# Patient Record
Sex: FEMALE | URBAN - METROPOLITAN AREA
[De-identification: names, ages, dates, MRNs, and addresses within clinical notes are randomized per-mention and may not be internally consistent; named-entity substitution may affect disease eponyms.]

---

## 2024-04-11 ENCOUNTER — TELEPHONE (OUTPATIENT)
Age: 84
End: 2024-04-11

## 2024-04-11 NOTE — TELEPHONE ENCOUNTER
[facility] Palliative Medicine Office  Nursing Note  [phone]  Fax [phone]      Name:  Lindsay Ramirez  YOB: 1940    Received outpatient Palliative Medicine referral from Dr. Eulogio Mina (Partner MD) to see patient for symptom management and supportive care. Chart  reviewed. Lindsay Ramirez is a 83 y.o. female with primary cutaneous T-cell lymphoma.  Referral states that patient has history of multiple rounds of immunotherapy interrupted by drug-related complications, recent hospitalization with severe drug reaction to mogamalizumab.    ACP:     No ACP documents on file    Nurse called patient and her daughter answered the phone. She states that she schedules patient's appointments.   This nurse explained [facility] outpatient Palliative Medicine services. Appointment scheduled for 4/18/24 at 1pm with Dr. Brittney Toussaint.      Ginny Lambert, RN, Gerontological Nursing-BC, [facility]

## 2024-04-16 ENCOUNTER — TELEPHONE (OUTPATIENT)
Age: 84
End: 2024-04-16

## 2024-04-16 NOTE — TELEPHONE ENCOUNTER
Called patient to advise/confirm upcoming appt with Dr. Toussaint on 04/18/2024 at 1:00  at [facility].  Spoke with Neelam and confirmed appointment.

## 2024-04-18 ENCOUNTER — OFFICE VISIT (OUTPATIENT)
Age: 84
End: 2024-04-18
Payer: MEDICARE

## 2024-04-18 VITALS
OXYGEN SATURATION: 99 % | BODY MASS INDEX: 23.57 KG/M2 | DIASTOLIC BLOOD PRESSURE: 68 MMHG | HEIGHT: 62 IN | HEART RATE: 91 BPM | SYSTOLIC BLOOD PRESSURE: 152 MMHG | WEIGHT: 128.1 LBS

## 2024-04-18 DIAGNOSIS — C84.A8 CUTANEOUS T-CELL LYMPHOMA INVOLVING LYMPH NODES OF MULTIPLE REGIONS (HCC): ICD-10-CM

## 2024-04-18 DIAGNOSIS — R53.0 NEOPLASTIC (MALIGNANT) RELATED FATIGUE: Primary | ICD-10-CM

## 2024-04-18 DIAGNOSIS — Z71.89 ACP (ADVANCE CARE PLANNING): ICD-10-CM

## 2024-04-18 DIAGNOSIS — L65.8 ALOPECIA DUE TO CYTOTOXIC DRUG: ICD-10-CM

## 2024-04-18 DIAGNOSIS — T45.1X5A ALOPECIA DUE TO CYTOTOXIC DRUG: ICD-10-CM

## 2024-04-18 PROCEDURE — G8400 PT W/DXA NO RESULTS DOC: HCPCS | Performed by: INTERNAL MEDICINE

## 2024-04-18 PROCEDURE — G8427 DOCREV CUR MEDS BY ELIG CLIN: HCPCS | Performed by: INTERNAL MEDICINE

## 2024-04-18 PROCEDURE — 99205 OFFICE O/P NEW HI 60 MIN: CPT | Performed by: INTERNAL MEDICINE

## 2024-04-18 PROCEDURE — 4004F PT TOBACCO SCREEN RCVD TLK: CPT | Performed by: INTERNAL MEDICINE

## 2024-04-18 PROCEDURE — 1090F PRES/ABSN URINE INCON ASSESS: CPT | Performed by: INTERNAL MEDICINE

## 2024-04-18 PROCEDURE — G8420 CALC BMI NORM PARAMETERS: HCPCS | Performed by: INTERNAL MEDICINE

## 2024-04-18 PROCEDURE — 1123F ACP DISCUSS/DSCN MKR DOCD: CPT | Performed by: INTERNAL MEDICINE

## 2024-04-18 RX ORDER — HYDROCORTISONE 10 MG/1
10 TABLET ORAL DAILY
COMMUNITY

## 2024-04-18 RX ORDER — PROCHLORPERAZINE MALEATE 10 MG
10 TABLET ORAL 3 TIMES DAILY PRN
COMMUNITY

## 2024-04-18 RX ORDER — LISINOPRIL 10 MG/1
10 TABLET ORAL DAILY
COMMUNITY

## 2024-04-18 RX ORDER — TRIAMCINOLONE ACETONIDE 1 MG/G
1 CREAM TOPICAL 2 TIMES DAILY
COMMUNITY

## 2024-04-18 RX ORDER — AMLODIPINE BESYLATE 5 MG/1
5 TABLET ORAL DAILY
COMMUNITY

## 2024-04-18 RX ORDER — DIPHENHYDRAMINE HCL 25 MG
25 CAPSULE ORAL NIGHTLY PRN
COMMUNITY

## 2024-04-18 RX ORDER — ACETAMINOPHEN 325 MG/1
650 TABLET ORAL EVERY 6 HOURS PRN
COMMUNITY

## 2024-04-18 RX ORDER — ONDANSETRON 4 MG/1
4 TABLET, FILM COATED ORAL EVERY 8 HOURS PRN
COMMUNITY

## 2024-04-18 RX ORDER — TRAZODONE HYDROCHLORIDE 50 MG/1
50 TABLET ORAL NIGHTLY
COMMUNITY

## 2024-04-18 RX ORDER — CHOLECALCIFEROL (VITAMIN D3) 125 MCG
5 CAPSULE ORAL NIGHTLY
COMMUNITY

## 2024-04-18 RX ORDER — VALACYCLOVIR HYDROCHLORIDE 1 G/1
1000 TABLET, FILM COATED ORAL DAILY
COMMUNITY

## 2024-04-18 NOTE — PROGRESS NOTES
Palliative Medicine Outpatient Services  [place]: [phone])    Patient Name: Lindsay Ramirez  YOB: 1940    Date of Current Visit: 04/18/2024  Location of Current Visit:    [x] [facility] Office  [] [facility] Office  [] [facility] Office  [] Home  [] Synchronous real-time A/V virtual visit    Date of Initial Palliative Medicine Visit: 4/18/24   Referral from: PCP Dr. Eulogio Mina    REASON FOR VISIT:   [x] Establish care   [] Follow up   [] Urgent     FOLLOW UP:   Return in about 3 months (around 7/18/2024) for routine check in- sooner as needed for any symptoms or support needs.     ASSESSMENT AND PLAN:     Fatigue- multifactorial, cancer related, volume losses, hospitalization, medication factors.   - was sleeping poorly while hospitalized; initiated on Trazodone but only takine 1/4 of 50 mg tab. Advised to stop today.    - If re-emergence of sleeping difficulty she is to call  - stop melatonin 5 mg HS as well; this was also new from hospitalization  - has anti-emetics, benadryl available but not taking regularly.     Alopecia dt cytoxic drug  - prescription written for cranial prosthesis (wig)    Pruritus  - recommended cetirizine or Zyzal + Ranitidine 150 mg BID when needed over Benadryl    CTCL  - followed by Dr. Roque at [facility]  - palliative Keytruda    Support and Coping  - she is well supported by both daughters, dtr Neelam alcaraz and assists with all medical appts and now arranging home aids, arranging her pill box  - pt hopes to maintain her independence in her own home as long as possible.     ACP  - see note 4/18/24     RECENT / CURRENT DISEASE DIRECTED THERAPY:   Primary hematologist- Dr. Roque at [facility]    Targretin 7/2019-->4/2022  Mogamulizumab 4/2022 -->4/2023  Phase 1 clinical trial enrollment 2/2024 --> stopped due to  clinical deterioration; need for clinically proven alternative.   Keytruda, C1 on 3/28/24, C2 4/16/24    HISTORY OF PRESENT ILLNESS:   Lindsay Ramirez is a 83 y.o. year old with a history of

## 2024-04-18 NOTE — PROGRESS NOTES
Palliative Medicine Outpatient Clinic  Nurse Check in Note  [phone]    Patient Name: Lindsay Ramirez  YOB: 1940      Date of Visit: 04/18/2024  Visit Location:  [facility]    Chief patient or family concern today: Initial Visit   Rash using twice daily steroid cream. Mouth sores impeding ability to eat. Wounds on feet making hard to walk. Cracking around joint on hands.    Medications:  Med reconciliation was performed with:  Other family member    Requested refills:  None    If prescribed an opioid, does patient have access to naloxone at home?:  No  If No, pend naloxone nasal spray    Function and Symptoms:  Use of assist devices:  None    Palliative Performance Status (PPS): 60       ESAS:       Constipation?  No  Last BM: 4/17/24    Advance Care Planning:  Currently listed healthcare agent:       Is there an ACP Note within the past 12 months?  NO  If No, Alert Clinician and/or Social Work      Christina Edwards RN

## 2024-04-18 NOTE — PATIENT INSTRUCTIONS
Adrenal insufficiency self-care instructions and sick day rules:      Adrenal insufficiency, which involves a deficiency in steroid hormones that are normally produced by the body, can result in symptoms that include generalized and severe fatigue, weakness, nausea/vomiting, dizziness, and low blood pressure.     1. Please get an alert bracelet that says \"Adrenal insufficiency. Give stress doses of steroids in case of illness.\"      2. If you become nauseated and are unable to keep your steroid (hydrocortisone) down, you need to administer steroids intramuscularly (solucortef), OR, if you are not equipped to do this you need to go to an emergency room to get this medication through an IV.       3. If you are ill with a low-grade fever of .4 F, please double your dose of hydrocortisone for 3 days. If you have a fever of >100.4 F, please triple your hydrocortisone dose for 3 days or until fever resolves.      4. If you are undergoing a planned medical procedure (such as minor surgery, colonoscopy) or a major dental procedure (tooth extraction, root canal, etc.) you should double your dose the day before, the day of and the day after the procedure.     5. If a major surgery requiring general anesthesia is being planned, please notify your endocrinologist so that we can give instructions to the anesthesia team that will be taking care of you with regards to the amount of hydrocortisone to be given during surgery.     Note: some people with adrenal insufficiency require a second steroid called fludrocortisone (florinef). If you are ill, increase your hydrocortisone as discussed above, and continue taking florinef as prescribed. You do NOT need to increase the dose of florinef.

## 2024-04-18 NOTE — PROGRESS NOTES
Palliative Medicine Outpatient Services  [place]: [phone] ([phone])    Patient Name: Lindsay Ramirez  YOB: 1940    Date of Current Visit: 04/18/2024  Location of Current Visit:    [x] [facility] Office  [] [facility] Office  [] [facility] Office  [] Home  [] Synchronous real-time A/V virtual visit    Date of Initial Palliative Medicine Visit: 4/18/24   Referral from: ***    REASON FOR VISIT:   [x] Establish care   [] Follow up   [] Urgent     FOLLOW UP:   No follow-ups on file.     ASSESSMENT AND PLAN:             Support and Coping     RECENT / CURRENT DISEASE DIRECTED THERAPY:   Primary hematologist- Dr. Roque at [facility]    Targretin 7/2019-->4/2022  Mogamulizumab 4/2022 -->4/2023  Phase 1 clinical trial enrollment 2/2024 --> stopped due to  clinical deterioration; need for clinically proven alternative.   Keytruda, C1 on 3/28/24, C2 4/16/24    HISTORY OF PRESENT ILLNESS:   Lindsay Ramirez is a 83 y.o. year old with a history of mycosis fungoides / CTCL, who was referred to Palliative Medicine by her PCP for symptom management and serious illness support.   Ms. Ramirez was initially diagnosed with CTCL via axillary LN biopsy in Apri of 2019.  See treatment history noted above which I obtained following thorough review of [facility] documentation from both hematology and dermatology.        Prior to Keytruda, note on 3/26 (Mya)    She developed recurrent rash in Feb of 2024.  worsening redness, dryness and itching of her skin all over her body with some ulceration of the scalp. She also reports some discomfort with urination due to the involvement of her perineal area. She denies any B symptoms. The prescribed steroids did not provide any notable relief per the patient.     She was hospitalized at [facility] 4/2/24-4/9/24 with progressive erythematous rash associated with rigors, abdominal distension and pelvic pain secondary to lymphadenopathy.  There was concern for pseudo-progression in the setting of immunotherapy vs true progression

## 2024-04-20 PROBLEM — C84.A8 CUTANEOUS T-CELL LYMPHOMA INVOLVING LYMPH NODES OF MULTIPLE REGIONS (HCC): Status: ACTIVE | Noted: 2024-04-20

## 2024-04-20 PROBLEM — Z71.89 ACP (ADVANCE CARE PLANNING): Status: ACTIVE | Noted: 2024-04-20

## 2024-04-20 PROBLEM — L65.8 ALOPECIA DUE TO CYTOTOXIC DRUG: Status: ACTIVE | Noted: 2024-04-20

## 2024-04-20 PROBLEM — T45.1X5A ALOPECIA DUE TO CYTOTOXIC DRUG: Status: ACTIVE | Noted: 2024-04-20

## 2024-04-20 PROBLEM — R53.0 NEOPLASTIC (MALIGNANT) RELATED FATIGUE: Status: ACTIVE | Noted: 2024-04-20

## 2024-04-20 NOTE — ACP (ADVANCE CARE PLANNING)
Advance Care Planning      Palliative Medicine Provider (MD/NP)  Advance Care Planning (ACP) Conversation      Date of Conversation: 04/20/24    The patient and/or authorized decision maker consented to a voluntary Advance Care Planning conversation.   Individuals present for the conversation:  Patient with decision making capacity  Other persons present:  Legal healthcare agent named below    Legal Healthcare Decision Maker(s):    Primary Decision Maker: Neelam Spencer - Child - [phone]    Secondary Decision Maker: Lori Ramirez - Child - [phone]    ACP documents available in EMR prior to discussion:  [] Advance Medical Directive  [] Portable DNR  [] POLST  [] [facility]   [x] None  [] ACP documents have been previously completed by patient or surrogate, but are not available today for review.        Primary Palliative Diagnosis:  CTCL    Conversation Summary:  Dtr Neelam is reported primary mPOA, dtr Lori secondary.  Document not available today, dtr advised to provide at next follow up visit.     Resuscitation Status:     Outcomes / Completed Documentation:  An explanation of advance directives and their importance was provided and the following forms completed:    [] Advance Medical Directive  [] Portable DNR   [] POLST  [] No new documents completed  [x] Patient or surrogate was asked to provide previously completed documents to the palliative team    If new document completed, original was provided to patient and/or family member.    Copy was placed for scanning into the [facility] EMR.      I spent - minutes providing separately identifiable ACP services with the patient and/or surrogate decision maker in a voluntary, in-person conversation discussing the patient's wishes and goals as detailed in the above note.       Brittney Toussaint MD

## 2025-04-15 ENCOUNTER — TRANSCRIBE ORDERS (OUTPATIENT)
Facility: HOSPITAL | Age: 85
End: 2025-04-15

## 2025-04-15 DIAGNOSIS — Z78.0 ASYMPTOMATIC MENOPAUSAL STATE: Primary | ICD-10-CM

## 2025-06-20 ENCOUNTER — HOSPITAL ENCOUNTER (OUTPATIENT)
Facility: HOSPITAL | Age: 85
Discharge: HOME | End: 2025-06-23
Attending: INTERNAL MEDICINE
Payer: MEDICARE

## 2025-06-20 DIAGNOSIS — Z78.0 ASYMPTOMATIC MENOPAUSAL STATE: ICD-10-CM

## 2025-06-20 PROCEDURE — 77080 DXA BONE DENSITY AXIAL: CPT
